# Patient Record
(demographics unavailable — no encounter records)

---

## 2024-11-25 NOTE — DISEASE MANAGEMENT
[Pathological] : TNM Stage: p [FreeTextEntry4] : left chest wall, myxofibrosarcoma, grade 3 [TTNM] : 1 [NTNM] : 0 [MTNM] : 0 [II] : II

## 2024-11-25 NOTE — HISTORY OF PRESENT ILLNESS
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 Status post left chest wall flap reconstruction with associated surgical changes. No masslike enhancement within the surgical bed to suggest recurrence or residual disease. Continued short interval follow-up recommended.  CT chest: IMPRESSION: Stable exam.    pain,edema, fatigue or weight loss  Care team: DANTE Arora

## 2024-12-13 NOTE — REVIEW OF SYSTEMS
[Negative] : Allergic/Immunologic [Dysphagia: Grade 0] : Dysphagia: Grade 0 [Edema Limbs: Grade 0] : Edema Limbs: Grade 0  [Fatigue: Grade 0] : Fatigue: Grade 0 [Localized Edema: Grade 0] : Localized Edema: Grade 0  [Neck Edema: Grade 0] : Neck Edema: Grade 0 [Cough: Grade 0] : Cough: Grade 0 [Dyspnea: Grade 0] : Dyspnea: Grade 0 [Skin Hyperpigmentation: Grade 0] : Skin Hyperpigmentation: Grade 0

## 2024-12-13 NOTE — PHYSICAL EXAM
[Extraocular Movements] : extraocular movements were intact [Outer Ear] : the ears and nose were normal in appearance [Normal] : no palpable adenopathy [Supraclavicular Lymph Nodes Enlarged Bilaterally] : supraclavicular [Axillary Lymph Nodes Enlarged Bilaterally] : axillary [de-identified] : Mild hyperpigmentation of the left chest wall upper outer, with mild induration.  No palpable nodularity or mass lesions.  Shoulder range of motion asymmetric.

## 2024-12-13 NOTE — HISTORY OF PRESENT ILLNESS
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   12/5/24 MRI chest showed no discrete nodular enhancement to suggest recurrent disease. Feathery edema in the surgical bed and along the left pectoralis major muscle and adjacent shoulder, with vague mild nonmasslike enhancement is seen in these regions, likely related to posttreatment change.   She reports some tightness in the treated region, but no vickie pain. 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 No dyspnea or cough.  Stable weight.  Care team: Surgical oncologist Anurag Arora

## 2024-12-13 NOTE — PHYSICAL EXAM
[Extraocular Movements] : extraocular movements were intact [Outer Ear] : the ears and nose were normal in appearance [Normal] : no palpable adenopathy [Supraclavicular Lymph Nodes Enlarged Bilaterally] : supraclavicular [Axillary Lymph Nodes Enlarged Bilaterally] : axillary [de-identified] : Mild hyperpigmentation of the left chest wall upper outer, with mild induration.  No palpable nodularity or mass lesions.  Shoulder range of motion asymmetric.

## 2025-02-18 NOTE — HISTORY OF PRESENT ILLNESS
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

## 2025-02-18 NOTE — DISEASE MANAGEMENT
[Pathological] : TNM Stage: p [FreeTextEntry4] : left chest wall, myxofibrosarcoma, grade 3 [NTNM] : 0 [TTNM] : 1 [MTNM] : 0 [II] : II

## 2025-04-02 NOTE — REVIEW OF SYSTEMS
[Joint Pain] : joint pain [Negative] : Allergic/Immunologic [Dysphagia: Grade 0] : Dysphagia: Grade 0 [Edema Limbs: Grade 0] : Edema Limbs: Grade 0  [Fatigue: Grade 0] : Fatigue: Grade 0 [Localized Edema: Grade 0] : Localized Edema: Grade 0  [Neck Edema: Grade 0] : Neck Edema: Grade 0 [FreeTextEntry9] : neck [Skin Hyperpigmentation: Grade 0] : Skin Hyperpigmentation: Grade 0 [Dermatitis Radiation: Grade 0] : Dermatitis Radiation: Grade 0

## 2025-04-02 NOTE — HISTORY OF PRESENT ILLNESS
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  Interval history: Underwent workup for pains radiating down UE and LE.    3/18/25 MRI C spine noted degenerative disc disease and facet arthrosis throughout the cervical spine, with posterior disc protrusion at C3-C4 more pronounced than prior.  3/21/25 CT chest was ALYSSIA; no change from prior.  3/21/25 MR chest showed no nodular or masslike enhancement to suggest recurrence.  She is following with Dr. Calos Tripathi, and planning to undergo injections to her C spine.   No local dermatitis, no chest pain, or edema.  No significant fatigue or weight loss.  Care team: DANTE Tripathi

## 2025-04-02 NOTE — VITALS
[Maximal Pain Intensity: 5/10] : 5/10 [Least Pain Intensity: 5/10] : 5/10 [Pain Description/Quality: ___] : Pain description/quality: [unfilled] [Pain Duration: ___] : Pain duration: [unfilled] [Pain Location: ___] : Pain Location: [unfilled] [Pain Interferes with ADLs] : Pain interferes with activities of daily living. [OTC] : OTC [80: Normal activity with effort; some signs or symptoms of disease.] : 80: Normal activity with effort; some signs or symptoms of disease.